# Patient Record
Sex: MALE | Race: BLACK OR AFRICAN AMERICAN | NOT HISPANIC OR LATINO | URBAN - METROPOLITAN AREA
[De-identification: names, ages, dates, MRNs, and addresses within clinical notes are randomized per-mention and may not be internally consistent; named-entity substitution may affect disease eponyms.]

---

## 2025-02-17 ENCOUNTER — OFFICE VISIT (OUTPATIENT)
Dept: URGENT CARE | Age: 25
End: 2025-02-17
Payer: COMMERCIAL

## 2025-02-17 VITALS
SYSTOLIC BLOOD PRESSURE: 144 MMHG | OXYGEN SATURATION: 98 % | TEMPERATURE: 97.9 F | RESPIRATION RATE: 19 BRPM | WEIGHT: 215 LBS | DIASTOLIC BLOOD PRESSURE: 86 MMHG

## 2025-02-17 DIAGNOSIS — U07.1 COVID: Primary | ICD-10-CM

## 2025-02-17 DIAGNOSIS — Z20.822 EXPOSURE TO CONFIRMED CASE OF COVID-19: ICD-10-CM

## 2025-02-17 LAB — POC SARS-COV-2 AG BINAX: ABNORMAL

## 2025-02-17 ASSESSMENT — ENCOUNTER SYMPTOMS
FATIGUE: 1
EYES NEGATIVE: 1
MUSCULOSKELETAL NEGATIVE: 1
ALLERGIC/IMMUNOLOGIC NEGATIVE: 1
COUGH: 1
HEMATOLOGIC/LYMPHATIC NEGATIVE: 1
PSYCHIATRIC NEGATIVE: 1
CARDIOVASCULAR NEGATIVE: 1
NEUROLOGICAL NEGATIVE: 1
GASTROINTESTINAL NEGATIVE: 1
ENDOCRINE NEGATIVE: 1

## 2025-02-17 NOTE — Clinical Note
February 17, 2025     Patient: Hardik Eng   YOB: 2000   Date of Visit: 2/17/2025       To Whom It May Concern:    Hardik Eng was seen in my clinic on 2/17/2025 at 6:15 pm. Please excuse Hardik for his absence from work on this day to make the appointment.    If you have any questions or concerns, please don't hesitate to call.         Sincerely,         Sandy Hou, APRN-CNP        CC: No Recipients

## 2025-02-17 NOTE — LETTER
February 17, 2025     Patient: Hardik Eng   YOB: 2000   Date of Visit: 2/17/2025       To Whom It May Concern:    Hardik Eng was seen in my clinic on 2/17/2025 at 6:15 pm. Please excuse Hardik for his absence from work on this day to make the appointment. Hardik may return to work 2/18/2025.    If you have any questions or concerns, please don't hesitate to call.         Sincerely,         Sandy Hou, APRN-CNP        CC: No Recipients

## 2025-02-17 NOTE — PROGRESS NOTES
Subjective   Patient ID: Hardik Eng is a 24 y.o. male. They present today with a chief complaint of Request For Covid Testing (Exposure- at home ).    History of Present Illness    History provided by:  Patient   used: No    Other  Location:  Exposed to covid  Severity:  Mild  Onset quality:  Sudden  Duration:  1 hour  Timing:  Intermittent  Progression:  Worsening  Associated symptoms: cough and fatigue        Past Medical History  Allergies as of 02/17/2025 - Reviewed 02/17/2025   Allergen Reaction Noted    Aspirin Hives, Rash, and Swelling 08/01/2016       (Not in a hospital admission)       No past medical history on file.    No past surgical history on file.     reports that he has never smoked. He does not have any smokeless tobacco history on file. He reports current alcohol use of about 2.0 standard drinks of alcohol per week. He reports that he does not currently use drugs.    Review of Systems  Review of Systems   Constitutional:  Positive for fatigue.   HENT: Negative.     Eyes: Negative.    Respiratory:  Positive for cough.    Cardiovascular: Negative.    Gastrointestinal: Negative.    Endocrine: Negative.    Genitourinary: Negative.    Musculoskeletal: Negative.    Allergic/Immunologic: Negative.    Neurological: Negative.    Hematological: Negative.    Psychiatric/Behavioral: Negative.     All other systems reviewed and are negative.                                 Objective    Vitals:    02/17/25 1816 02/17/25 1819   BP: (!) 151/92 144/86   BP Location: Right arm Left arm   Resp: 19    Temp: 36.6 °C (97.9 °F)    SpO2: 98%    Weight: 97.5 kg (215 lb)      No LMP for male patient.    Physical Exam  Vitals and nursing note reviewed.   Constitutional:       Appearance: Normal appearance. He is normal weight.   HENT:      Right Ear: Tympanic membrane normal.      Left Ear: Tympanic membrane normal.      Nose: Congestion and rhinorrhea present.   Cardiovascular:      Rate and Rhythm:  Normal rate and regular rhythm.      Pulses: Normal pulses.      Heart sounds: Normal heart sounds.   Pulmonary:      Effort: Pulmonary effort is normal.      Breath sounds: Normal breath sounds.   Abdominal:      General: Bowel sounds are normal.      Palpations: Abdomen is soft.   Musculoskeletal:         General: Normal range of motion.      Cervical back: Normal range of motion.   Skin:     General: Skin is warm.   Neurological:      General: No focal deficit present.      Mental Status: He is alert and oriented to person, place, and time. Mental status is at baseline.   Psychiatric:         Mood and Affect: Mood normal.         Behavior: Behavior normal.         Thought Content: Thought content normal.         Procedures    Point of Care Test & Imaging Results from this visit  Results for orders placed or performed in visit on 02/17/25   POCT Covid-19 Rapid Antigen   Result Value Ref Range    POC BRYAN-COV-2 AG Positive test for SARS-CoV-2 (antigen detected) (A) Presumptive negative test for SARS-CoV-2 (no antigen detected)      No results found.    Diagnostic study results (if any) were reviewed by MARIA DE JESUS Ruiz.    Assessment/Plan   Allergies, medications, history, and pertinent labs/EKGs/Imaging reviewed by MARIA DE JESUS Ruiz.     Medical Decision Making  Medical Decision Making  At time of discharge patient was clinically well-appearing and HDS for outpatient management. The patient and/or family was educated regarding diagnosis, supportive care, OTC and Rx medications. The patient and/or family was given the opportunity to ask questions prior to discharge.  They verbalized understanding of my discussion of the plans for treatment, expected course, indications to return to  or seek further evaluation in ED, and the need for timely follow up as directed.   They were provided with a work/school excuse if requested.        Orders and Diagnoses  Diagnoses and all orders for this  visit:  COVID  Exposure to confirmed case of COVID-19  -     POCT Covid-19 Rapid Antigen    -supportive care; increase fluids, rest     Return to Urgent care if symptoms return or progress  Follow up with PCP in 1-2 weeks    Patient disposition: Home    Electronically signed by MARIA DE JESUS Ruiz  7:54 PM